# Patient Record
Sex: MALE | Race: BLACK OR AFRICAN AMERICAN | NOT HISPANIC OR LATINO | Employment: FULL TIME | URBAN - METROPOLITAN AREA
[De-identification: names, ages, dates, MRNs, and addresses within clinical notes are randomized per-mention and may not be internally consistent; named-entity substitution may affect disease eponyms.]

---
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
## 2024-02-08 ENCOUNTER — HOSPITAL ENCOUNTER (EMERGENCY)
Facility: CLINIC | Age: 19
Discharge: HOME | End: 2024-02-08
Payer: COMMERCIAL

## 2024-02-08 ENCOUNTER — MEDICAL CORRESPONDENCE (OUTPATIENT)
Dept: HEALTH INFORMATION MANAGEMENT | Facility: CLINIC | Age: 19
End: 2024-02-08

## 2024-02-08 ENCOUNTER — TRANSFERRED RECORDS (OUTPATIENT)
Dept: HEALTH INFORMATION MANAGEMENT | Facility: CLINIC | Age: 19
End: 2024-02-08

## 2024-02-08 VITALS
SYSTOLIC BLOOD PRESSURE: 120 MMHG | RESPIRATION RATE: 12 BRPM | HEART RATE: 79 BPM | OXYGEN SATURATION: 99 % | TEMPERATURE: 98.6 F | WEIGHT: 129.41 LBS | BODY MASS INDEX: 20.89 KG/M2 | DIASTOLIC BLOOD PRESSURE: 66 MMHG

## 2024-02-08 DIAGNOSIS — L60.8 LONGITUDINAL MELANONYCHIA: ICD-10-CM

## 2024-02-08 LAB
BASOPHILS # BLD AUTO: 0 10E3/UL (ref 0–0.2)
BASOPHILS NFR BLD AUTO: 1 %
EOSINOPHIL # BLD AUTO: 0.1 10E3/UL (ref 0–0.7)
EOSINOPHIL NFR BLD AUTO: 2 %
ERYTHROCYTE [DISTWIDTH] IN BLOOD BY AUTOMATED COUNT: 12.6 % (ref 10–15)
HCT VFR BLD AUTO: 43.2 % (ref 40–53)
HGB BLD-MCNC: 13.6 G/DL (ref 13.3–17.7)
IMM GRANULOCYTES # BLD: 0 10E3/UL
IMM GRANULOCYTES NFR BLD: 0 %
LYMPHOCYTES # BLD AUTO: 2.1 10E3/UL (ref 0.8–5.3)
LYMPHOCYTES NFR BLD AUTO: 39 %
MCH RBC QN AUTO: 25.6 PG (ref 26.5–33)
MCHC RBC AUTO-ENTMCNC: 31.5 G/DL (ref 31.5–36.5)
MCV RBC AUTO: 81 FL (ref 78–100)
MONOCYTES # BLD AUTO: 0.5 10E3/UL (ref 0–1.3)
MONOCYTES NFR BLD AUTO: 9 %
NEUTROPHILS # BLD AUTO: 2.6 10E3/UL (ref 1.6–8.3)
NEUTROPHILS NFR BLD AUTO: 49 %
NRBC # BLD AUTO: 0 10E3/UL
NRBC BLD AUTO-RTO: 0 /100
PLATELET # BLD AUTO: 232 10E3/UL (ref 150–450)
RBC # BLD AUTO: 5.32 10E6/UL (ref 4.4–5.9)
WBC # BLD AUTO: 5.3 10E3/UL (ref 4–11)

## 2024-02-08 PROCEDURE — 36415 COLL VENOUS BLD VENIPUNCTURE: CPT

## 2024-02-08 PROCEDURE — 99283 EMERGENCY DEPT VISIT LOW MDM: CPT

## 2024-02-08 PROCEDURE — 85025 COMPLETE CBC W/AUTO DIFF WBC: CPT

## 2024-02-08 ASSESSMENT — ACTIVITIES OF DAILY LIVING (ADL): ADLS_ACUITY_SCORE: 33

## 2024-02-08 NOTE — DISCHARGE INSTRUCTIONS
You were seen and evaluated here in the emergency department for some black longitudinal lines underneath your thumb nails bilaterally and on one of your toenails.  As discussed, it is unlikely that this represents melanoma as it is in multiple fingernails and you are young and age.  However, we will refer you to dermatology and you will get a call from [facility] to schedule recheck.  You are not on any medications that could be causing this.  You do say you have a history of picking at your nails and repetitive nail trauma could be a cause.  We did check your platelets and they are normal and there is no sign of abnormal bleeding.  Sometimes this condition is idiopathic and can be more common in your demographic group.  Also, sometimes I understand it could be due to vitamin deficiencies which we do not check here emergently.  I have referred you to primary care for this.  At this juncture, you are safe for discharge home with follow-up with primary care, a referral was provided.  Will also get a call from [facility] to be seen by dermatology.

## 2024-02-08 NOTE — ED PROVIDER NOTES
History     Chief Complaint:  Rash       HPI   Jiran Sol Wilber Biggs is a 18 year old male who presents with dark discoloration of the bilateral thumbs, and left toe. The patient reports that he has not been able to get an appointment with dermatology for evaluation of the discoloration.  Noticed this in the past week.  He denies any injury or trauma to the areas, but he does note that he frequently picks at his nails. The patient denies nausea, fever, cough, chest pain, shortness of breath, bloody stool, bloody gums, nosebleeds, hematuria and other rashes.  He states sometimes he gets some discomfort in his joints but is not experiencing this now and does not know if this could be related.  The patient is otherwise healthy and takes no medications. Of note, the patient has family history of multiple myeloma and he is concerned that the spots under his nails could represent cancer.  Denies taking any current medications and states he is otherwise healthy.    Independent Historian:   None - Patient Only    Review of External Notes:   None      Medications:    The patient is not currently taking any prescribed medications.     Past Medical History:    The patient denies any significant past medical history.       Physical Exam   Patient Vitals for the past 24 hrs:   BP Temp Temp src Pulse Resp SpO2 Weight   02/08/24 1607 117/65 98.6  F (37  C) Temporal 79 20 100 % 58.7 kg (129 lb 6.6 oz)        Physical Exam  General: Young adult male sitting in exam room, nontoxic-appearing.  HENT:   Head: Atraumatic.  Mouth/Throat: Oropharynx clear and moist.  Eyes: Conjunctive and EOM normal.   Neck: Normal ROM. No rigidity.  CV: Giller rate and rhythm.  Resp: Normal respiratory effort. No stridor or cough observed.  MSK: Normal range of motion.  Skin: Patient has longitudinal melanonychia to the bilateral thumb nails and to 1 toenail as photo below.  No petechia or purpura.  Neuro: Awake, alert.  Psych: Normal mood and  affect.                Emergency Department Course     Laboratory:  Labs Ordered and Resulted from Time of ED Arrival to Time of ED Departure   CBC WITH PLATELETS AND DIFFERENTIAL - Abnormal       Result Value    WBC Count 5.3      RBC Count 5.32      Hemoglobin 13.6      Hematocrit 43.2      MCV 81      MCH 25.6 (*)     MCHC 31.5      RDW 12.6      Platelet Count 232      % Neutrophils 49      % Lymphocytes 39      % Monocytes 9      % Eosinophils 2      % Basophils 1      % Immature Granulocytes 0      NRBCs per 100 WBC 0      Absolute Neutrophils 2.6      Absolute Lymphocytes 2.1      Absolute Monocytes 0.5      Absolute Eosinophils 0.1      Absolute Basophils 0.0      Absolute Immature Granulocytes 0.0      Absolute NRBCs 0.0          Emergency Department Course & Assessments:             Interventions:  Medications - No data to display     Assessments:  1708 I obtained history and examined the patient as noted above  1729 I rechecked patient and explained findings.     Independent Interpretation (X-rays, CTs, rhythm strip):  None    Consultations/Discussion of Management or Tests:  None        Social Determinants of Health affecting care:   None    Disposition:  The patient was discharged.     Impression & Plan        Medical Decision Making:  Vandana a remarkably pleasant 18-year-old otherwise healthy male on no medications who came into the emergency department for evaluation of some dark lines that appeared underneath his bilateral thumbs and one of his toenails recently per HPI above.  On arrival, vital signs are all normal.  Photos captured in exam above.  He was concerned about cancer and I explained that this is highly unlikely due to this appearing in the bilateral nails at the same time.  However I have referred him to dermatology and he will get a call from [facility] for recheck.  I did check his platelets which are robust and reassuring and he has no other signs of any abnormal bleeding.  He is  not on any medications that could be causing this.  He does repetitively pick at his fingernails and I was learning that sometimes repetitive trauma can instigate this.  Can also be idiopathic and more common in his demographic group.  At this juncture I feel he is safe for discharge home.  I will refer him to primary care for recheck and he would benefit from getting checked for any possible vitamin deficiencies that could be related to this.  He will also get a call from [facility] dermatology for recheck in clinic.  In the meantime if he develops any new or concerning symptoms he can come back to the emergency department at any time.      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   2/8/2024   Ekaterina Nava PA-C Dewing, Jennifer C, PA-C  02/08/24 0382

## 2024-02-08 NOTE — ED TRIAGE NOTES
"Pt arrives with \"black lines\" underneath his finger nails and is concerned for cancer. Pt wants test completed to rule this out. Pt was encouraged to follow up with dermatology  by his PCP but they are booked out til later this year.  ABCS intact and Aox4.      Triage Assessment (Adult)       Row Name 02/08/24 1608 02/08/24 1606       Triage Assessment    Airway WDL WDL WDL       Respiratory WDL    Respiratory WDL WDL WDL       Cardiac WDL    Cardiac WDL WDL WDL                    "

## 2024-02-09 ENCOUNTER — TELEPHONE (OUTPATIENT)
Dept: DERMATOLOGY | Facility: CLINIC | Age: 19
End: 2024-02-09
Payer: COMMERCIAL

## 2024-02-09 NOTE — TELEPHONE ENCOUNTER
Patient Contact    Attempt # 1    Was call answered?  No    Left message on answering machine for patient to call back.    Verenice JETER RN  Dermatology   [phone]       Island Pedicle Flap Text: The defect edges were debeveled with a #15 scalpel blade.  Given the location of the defect, shape of the defect and the proximity to free margins an island pedicle advancement flap was deemed most appropriate.  Using a sterile surgical marker, an appropriate advancement flap was drawn incorporating the defect, outlining the appropriate donor tissue and placing the expected incisions within the relaxed skin tension lines where possible.    The area thus outlined was incised deep to adipose tissue with a #15 scalpel blade.  The skin margins were undermined to an appropriate distance in all directions around the primary defect and laterally outward around the island pedicle utilizing iris scissors.  There was minimal undermining beneath the pedicle flap.

## 2024-02-09 NOTE — TELEPHONE ENCOUNTER
This encounter is being sent to inform the clinic that this patient has a referral from Ekaterina Nava PA-C for the diagnoses of Longitudinal melanonychia and has requested that this patient be seen within 3-5 days. Based on the availability of our provider(s), we are unable to accommodate this request.    Were all sites offered this patient?  Yes    Does scheduling algorithm request to schedule next available?  Patient has been scheduled for the first available opening with Sahara Weinstein PA-C on 9/6/2024.  We have informed the patient that the clinic will review their referral and reach out if a sooner appointment is medically necessary.

## 2024-02-13 NOTE — TELEPHONE ENCOUNTER
Called and spoke with pt who was able to schedule at an outside clinic for this week.    Closing encounter.    Verenice JETER RN  Dermatology   [phone]

## 2024-02-14 ENCOUNTER — TRANSCRIBE ORDERS (OUTPATIENT)
Dept: OTHER | Age: 19
End: 2024-02-14

## 2024-02-14 ENCOUNTER — TELEPHONE (OUTPATIENT)
Dept: ORTHOPEDICS | Facility: CLINIC | Age: 19
End: 2024-02-14
Payer: COMMERCIAL

## 2024-02-14 DIAGNOSIS — M79.10 MUSCLE ACHE: Primary | ICD-10-CM

## 2024-02-14 NOTE — TELEPHONE ENCOUNTER
Called and spoke with pt. Declined to schedule at this time but said they'd call back to make appt.     Writer faxed order to orthopedics central scheduling for transcription and scanning and returned paper copy to care team.     Order info:    Referral for dx: Muscle Ache  Ordering provider: Dianne Panchal MD from [facility]

## 2024-02-14 NOTE — TELEPHONE ENCOUNTER
Received faxed referral from [facility] [place] requesting patient be evaluated by orthopedics for muscle ache / joint pain. Okay to schedule with sports medicine.    LVM for return call to discuss.     Phone Number patient can be reached at:  Cell number on file:    Telephone Information:   Mobile [phone]     Can we leave a detailed message on this number:  NO    Casandra Agudelo MBA, ATC

## 2024-03-26 ENCOUNTER — TELEPHONE (OUTPATIENT)
Dept: ORTHOPEDICS | Facility: CLINIC | Age: 19
End: 2024-03-26
Payer: COMMERCIAL

## 2024-03-26 NOTE — TELEPHONE ENCOUNTER
Made second attempt to reach the patient to reschedule there appointment this thursday with Selma Ross PA-C.     Left brief message asking for a callback to reschedule the appointment. If the patient calls back please reschedule with another provider and cancel the thursday appointment with Selma Ross PA-C.    Cecily Abraham, DALI, LAT

## 2024-03-26 NOTE — TELEPHONE ENCOUNTER
No consent to communicate.    Phone call to patient to reschedule them with another provider due to Selma Ross PA-C being only walk in clinic. Left a brief message asking for a callback and provided the  number. If patient calls back please cancel and reschedule the appointment with another provider.     Cecily Abraham, DALI, LAT

## 2024-03-28 ENCOUNTER — OFFICE VISIT (OUTPATIENT)
Dept: ORTHOPEDICS | Facility: CLINIC | Age: 19
End: 2024-03-28
Payer: COMMERCIAL

## 2024-03-28 VITALS — WEIGHT: 130 LBS | BODY MASS INDEX: 20.89 KG/M2 | HEIGHT: 66 IN

## 2024-03-28 DIAGNOSIS — M79.10 MUSCLE ACHE: ICD-10-CM

## 2024-03-28 DIAGNOSIS — M25.541 ARTHRALGIA OF BOTH HANDS: Primary | ICD-10-CM

## 2024-03-28 DIAGNOSIS — M25.542 ARTHRALGIA OF BOTH HANDS: Primary | ICD-10-CM

## 2024-03-28 PROCEDURE — 99203 OFFICE O/P NEW LOW 30 MIN: CPT | Performed by: PHYSICIAN ASSISTANT

## 2024-03-28 NOTE — PATIENT INSTRUCTIONS
1. Arthralgia of both hands    2. Muscle ache        -Patient presents today for an evaluation of his bilateral upper and lower extremity joint pain with muscle ache.  He has not had any previous workup for this.  He describes that he does report stiffness about the joints.  He does have a family history of multiple myeloma in his paternal grandfather.  He does state that this has been ongoing for over a year and does note having similar symptoms when he was younger.  He has no mechanism of injury.  -I reviewed with him that on exam today I am not able to elicit any specific area of discomfort.  Given that he describes a whole body sensation, I recommend we do an evaluation for inflammatory arthritis workup.  I will go ahead and order blood work and once this is completed we will reach out via phone with the results as well as next steps.  -In the meantime I would like him to start taking ibuprofen 400 mg twice a day with food over the next 2 weeks to see if this makes any improvement in his overall symptoms.    -Call direct clinic number [[phone]] at any time with questions or concerns.

## 2024-03-28 NOTE — PROGRESS NOTES
"ASSESSMENT & PLAN  There are no Patient Instructions on file for this visit.      Selma Ross PA-C  [facility]    -----  No chief complaint on file.      SUBJECTIVE  Vandana Biggs is a/an 18 year old ***-handed male who is seen {FSOC CONSULT:034552} for evaluation of ***.  Onset was ***. Pain is located ***. Symptoms are worsened by ***.  He has tried ***. Associated symptoms include *** {Assoc Sx:624300}.    The patient is seen {patient accompanied:910286}    Prior injury/Surgical history of affected joint: ***  Social History/Occupation: ***    REVIEW OF SYSTEMS:  Pertinent positives/negative: As stated above in HPI    OBJECTIVE:  Ht 1.676 m (5' 6\")   Wt 59 kg (130 lb)   BMI 20.98 kg/m     General: Alert and in no distress  Skin: no visable rashes  CV: Extremities appear well perfused   Resp: normal respiratory effort, no conversational dyspnea   Psych: normal mood, affect  MSK: ***      RADIOLOGY:  Final results and radiologist's interpretation available in the [facility] health record.  Images below were personally reviewed and discussed with the patient in the office today.  My personal interpretation of the performed imaging: ***        "

## 2024-03-28 NOTE — LETTER
3/28/2024         RE: Vandana Biggs  [address] [address]        Dear Colleague,    Thank you for referring your patient, Vandana Biggs, to the [facility] [place]. Please see a copy of my visit note below.    ASSESSMENT & PLAN  Patient Instructions     1. Arthralgia of both hands    2. Muscle ache        -Patient presents today for an evaluation of his bilateral upper and lower extremity joint pain with muscle ache.  He has not had any previous workup for this.  He describes that he does report stiffness about the joints.  He does have a family history of multiple myeloma in his paternal grandfather.  He does state that this has been ongoing for over a year and does note having similar symptoms when he was younger.  He has no mechanism of injury.  -I reviewed with him that on exam today I am not able to elicit any specific area of discomfort.  Given that he describes a whole body sensation, I recommend we do an evaluation for inflammatory arthritis workup.  I will go ahead and order blood work and once this is completed we will reach out via phone with the results as well as next steps.  -In the meantime I would like him to start taking ibuprofen 400 mg twice a day with food over the next 2 weeks to see if this makes any improvement in his overall symptoms.    -Call direct clinic number [[phone]] at any time with questions or concerns.        Selma Ross PA-C  [facility]    -----  Chief Complaint   Patient presents with     Middle Back - Pain       SUBJECTIVE  Vandana Biggs is a/an 18 year old male who is seen in consultation at the request of  Dianne Panchal M.D. for evaluation of thoracic spine.  Onset was about a month ago when the patient started noticing in the pain. Pain is located between his scapula. Symptoms are worsened by bending over to  something, walking.  He has tried nothing. Associated  "symptoms include  Numbness/tingling.  He denies any constitutional symptoms, fevers, night sweats, unintentional loss or gain in weight.    The patient is seen alone    Prior injury/Surgical history of affected joint: nothing  Social History/Occupation: [employer]     REVIEW OF SYSTEMS:  Pertinent positives/negative: As stated above in HPI    OBJECTIVE:  Ht 1.676 m (5' 6\")   Wt 59 kg (130 lb)   BMI 20.98 kg/m     General: Alert and in no distress  Skin: no visable rashes  CV: Extremities appear well perfused   Resp: normal respiratory effort, no conversational dyspnea   Psych: normal mood, affect  MSK:   On exam, sensation is intact to light touch in the bilateral upper and bilateral lower extremity.  Radial pulses and dorsalis pedis pulses palpable and equal to contralateral extremity.  Extremities are warm and well-perfused.  Demonstration of shoulder range of motion, elbow range of motion, wrist and hand motion is within normal range without limits bilaterally.  He had mild tenderness about the right supraspinatus tendon.  Remainder of the exam is nontender to palpation of the bilateral upper extremity.  Palpation of the spine is nontender to palpation.  Lower extremity hip joint, knee joint ankle joint range of motion is within normal limits without deficit.  No pain is elicited on range of motion or with palpation of the bilateral lower extremities.  Palpation of the muscles throughout the upper and lower extremity without discomfort.  With forward flexion of the spine he did elicit some discomfort about the upper back reported.  No swelling of the joints is appreciated.         Again, thank you for allowing me to participate in the care of your patient.        Sincerely,        Selma Ross PA-C  "

## 2024-03-28 NOTE — PROGRESS NOTES
ASSESSMENT & PLAN  Patient Instructions     1. Arthralgia of both hands    2. Muscle ache        -Patient presents today for an evaluation of his bilateral upper and lower extremity joint pain with muscle ache.  He has not had any previous workup for this.  He describes that he does report stiffness about the joints.  He does have a family history of multiple myeloma in his paternal grandfather.  He does state that this has been ongoing for over a year and does note having similar symptoms when he was younger.  He has no mechanism of injury.  -I reviewed with him that on exam today I am not able to elicit any specific area of discomfort.  Given that he describes a whole body sensation, I recommend we do an evaluation for inflammatory arthritis workup.  I will go ahead and order blood work and once this is completed we will reach out via phone with the results as well as next steps.  -In the meantime I would like him to start taking ibuprofen 400 mg twice a day with food over the next 2 weeks to see if this makes any improvement in his overall symptoms.    -Call direct clinic number [[phone]] at any time with questions or concerns.        Selma Ross PA-C  [facility]    -----  Chief Complaint   Patient presents with    Middle Back - Pain       SUBJECTIVE  Vandana Biggs is a/an 18 year old male who is seen in consultation at the request of  Dianen Panchal M.D. for evaluation of thoracic spine.  Onset was about a month ago when the patient started noticing in the pain. Pain is located between his scapula. Symptoms are worsened by bending over to  something, walking.  He has tried nothing. Associated symptoms include  Numbness/tingling.  He denies any constitutional symptoms, fevers, night sweats, unintentional loss or gain in weight.    The patient is seen alone    Prior injury/Surgical history of affected joint: nothing  Social History/Occupation: [employer]  "    REVIEW OF SYSTEMS:  Pertinent positives/negative: As stated above in HPI    OBJECTIVE:  Ht 1.676 m (5' 6\")   Wt 59 kg (130 lb)   BMI 20.98 kg/m     General: Alert and in no distress  Skin: no visable rashes  CV: Extremities appear well perfused   Resp: normal respiratory effort, no conversational dyspnea   Psych: normal mood, affect  MSK:   On exam, sensation is intact to light touch in the bilateral upper and bilateral lower extremity.  Radial pulses and dorsalis pedis pulses palpable and equal to contralateral extremity.  Extremities are warm and well-perfused.  Demonstration of shoulder range of motion, elbow range of motion, wrist and hand motion is within normal range without limits bilaterally.  He had mild tenderness about the right supraspinatus tendon.  Remainder of the exam is nontender to palpation of the bilateral upper extremity.  Palpation of the spine is nontender to palpation.  Lower extremity hip joint, knee joint ankle joint range of motion is within normal limits without deficit.  No pain is elicited on range of motion or with palpation of the bilateral lower extremities.  Palpation of the muscles throughout the upper and lower extremity without discomfort.  With forward flexion of the spine he did elicit some discomfort about the upper back reported.  No swelling of the joints is appreciated.       "

## 2024-04-14 ENCOUNTER — HEALTH MAINTENANCE LETTER (OUTPATIENT)
Age: 19
End: 2024-04-14

## 2025-04-19 ENCOUNTER — HEALTH MAINTENANCE LETTER (OUTPATIENT)
Age: 20
End: 2025-04-19